# Patient Record
Sex: MALE | Race: WHITE | Employment: OTHER | URBAN - METROPOLITAN AREA
[De-identification: names, ages, dates, MRNs, and addresses within clinical notes are randomized per-mention and may not be internally consistent; named-entity substitution may affect disease eponyms.]

---

## 2017-06-03 PROBLEM — I21.4 NSTEMI (NON-ST ELEVATED MYOCARDIAL INFARCTION) (HCC): Status: ACTIVE | Noted: 2017-06-03

## 2017-06-03 PROBLEM — R07.9 CHEST PAIN: Status: ACTIVE | Noted: 2017-06-03

## 2017-06-05 PROBLEM — Z72.0 TOBACCO ABUSE: Status: ACTIVE | Noted: 2017-06-05

## 2017-06-05 PROBLEM — R73.03 PRE-DIABETES: Status: ACTIVE | Noted: 2017-06-05

## 2017-06-05 PROBLEM — E78.5 HYPERLIPIDEMIA: Status: ACTIVE | Noted: 2017-06-05

## 2017-06-12 PROBLEM — I82.90 THROMBUS: Status: ACTIVE | Noted: 2017-06-01

## 2017-06-16 ENCOUNTER — OFFICE VISIT (OUTPATIENT)
Dept: FAMILY MEDICINE CLINIC | Age: 52
End: 2017-06-16
Payer: COMMERCIAL

## 2017-06-16 ENCOUNTER — HOSPITAL ENCOUNTER (OUTPATIENT)
Age: 52
Setting detail: SPECIMEN
Discharge: HOME | End: 2017-06-16
Payer: COMMERCIAL

## 2017-06-16 DIAGNOSIS — Z51.81 MEDICATION MONITORING ENCOUNTER: ICD-10-CM

## 2017-06-16 DIAGNOSIS — I10 ESSENTIAL HYPERTENSION: ICD-10-CM

## 2017-06-16 DIAGNOSIS — F17.200 SMOKING: Primary | ICD-10-CM

## 2017-06-16 DIAGNOSIS — E78.2 MIXED HYPERLIPIDEMIA: ICD-10-CM

## 2017-06-16 DIAGNOSIS — I21.4 NSTEMI (NON-ST ELEVATED MYOCARDIAL INFARCTION) (HCC): ICD-10-CM

## 2017-06-16 PROCEDURE — 99204 OFFICE O/P NEW MOD 45 MIN: CPT | Performed by: FAMILY MEDICINE

## 2017-06-16 RX ORDER — ALPRAZOLAM 0.5 MG/1
0.5 TABLET ORAL NIGHTLY PRN
Qty: 90 TABLET | Refills: 1 | Status: SHIPPED | OUTPATIENT
Start: 2017-06-16 | End: 2018-01-11 | Stop reason: SDUPTHER

## 2017-06-16 RX ORDER — HYDROCODONE BITARTRATE AND ACETAMINOPHEN 5; 325 MG/1; MG/1
1 TABLET ORAL EVERY 8 HOURS
Qty: 90 TABLET | Refills: 0 | Status: SHIPPED | OUTPATIENT
Start: 2017-06-16 | End: 2017-07-18 | Stop reason: SDUPTHER

## 2017-06-16 RX ORDER — NICOTINE 21 MG/24HR
1 PATCH, TRANSDERMAL 24 HOURS TRANSDERMAL EVERY 24 HOURS
Qty: 30 PATCH | Refills: 3 | Status: SHIPPED | OUTPATIENT
Start: 2017-06-16 | End: 2019-04-16

## 2017-06-16 ASSESSMENT — PATIENT HEALTH QUESTIONNAIRE - PHQ9
1. LITTLE INTEREST OR PLEASURE IN DOING THINGS: 0
2. FEELING DOWN, DEPRESSED OR HOPELESS: 0
SUM OF ALL RESPONSES TO PHQ QUESTIONS 1-9: 0
SUM OF ALL RESPONSES TO PHQ9 QUESTIONS 1 & 2: 0

## 2017-06-16 ASSESSMENT — ENCOUNTER SYMPTOMS
ABDOMINAL PAIN: 0
BACK PAIN: 0
SORE THROAT: 0
STRIDOR: 0
CONSTIPATION: 0
EYE ITCHING: 0
RHINORRHEA: 0
CHEST TIGHTNESS: 0
EYE PAIN: 0
EYE DISCHARGE: 0
DIARRHEA: 0
APNEA: 0
BLOOD IN STOOL: 0
ABDOMINAL DISTENTION: 0
VOMITING: 0
SHORTNESS OF BREATH: 0
COUGH: 0
EYE REDNESS: 0
WHEEZING: 0
PHOTOPHOBIA: 0
SINUS PRESSURE: 0
CHOKING: 0
NAUSEA: 0
COLOR CHANGE: 0

## 2017-06-19 LAB
6-ACETYLMORPHINE, UR: NOT DETECTED
7-AMINOCLONAZEPAM, URINE: NOT DETECTED
ALPHA-OH-ALPRAZ, URINE: NOT DETECTED
ALPRAZOLAM, URINE: NOT DETECTED
AMPHETAMINES, URINE: NOT DETECTED
BARBITURATES, URINE: NOT DETECTED
BENZOYLECGONINE, UR: NOT DETECTED
BUPRENORPHINE URINE: NOT DETECTED
CARISOPRODOL, UR: NOT DETECTED
CLONAZEPAM, URINE: NOT DETECTED
CODEINE, URINE: NOT DETECTED
CREATININE URINE: 202.5 MG/DL (ref 20–400)
DIAZEPAM, URINE: NOT DETECTED
DRUGS EXPECTED, UR: NORMAL
EER HI RES INTERP UR: NORMAL
ETHYL GLUCURONIDE UR: NOT DETECTED
FENTANYL URINE: NOT DETECTED
HYDROCODONE, URINE: PRESENT
HYDROMORPHONE, URINE: NOT DETECTED
LORAZEPAM, URINE: NOT DETECTED
MARIJUANA METAB, UR: NOT DETECTED
MDA, UR: NOT DETECTED
MDEA, EVE, UR: NOT DETECTED
MDMA URINE: NOT DETECTED
MEPERIDINE METAB, UR: NOT DETECTED
METHADONE, URINE: NOT DETECTED
METHAMPHETAMINE, URINE: NOT DETECTED
METHYLPHENIDATE: NOT DETECTED
MIDAZOLAM, URINE: NOT DETECTED
MORPHINE URINE: NOT DETECTED
NORBUPRENORPHINE, URINE: NOT DETECTED
NORDIAZEPAM, URINE: NOT DETECTED
NORFENTANYL, URINE: NOT DETECTED
NORHYDROCODONE, URINE: PRESENT
NOROXYCODONE, URINE: NOT DETECTED
NOROXYMORPHONE, URINE: NOT DETECTED
OXAZEPAM, URINE: NOT DETECTED
OXYCODONE URINE: NOT DETECTED
OXYMORPHONE, URINE: NOT DETECTED
PAIN MANAGEMENT DRUG PANEL INTERP, URINE: NORMAL
PAIN MGT DRUG PANEL, HI RES, UR: NORMAL
PCP,URINE: NOT DETECTED
PHENTERMINE, UR: NOT DETECTED
PROPOXYPHENE, URINE: NOT DETECTED
TAPENTADOL, URINE: NOT DETECTED
TAPENTADOL-O-SULFATE, URINE: NOT DETECTED
TEMAZEPAM, URINE: NOT DETECTED
TRAMADOL, URINE: NOT DETECTED
ZOLPIDEM, URINE: NOT DETECTED

## 2017-07-18 RX ORDER — HYDROCODONE BITARTRATE AND ACETAMINOPHEN 5; 325 MG/1; MG/1
1 TABLET ORAL EVERY 8 HOURS
Qty: 90 TABLET | Refills: 0 | Status: SHIPPED | OUTPATIENT
Start: 2017-07-18 | End: 2017-08-22 | Stop reason: SDUPTHER

## 2017-08-22 RX ORDER — HYDROCODONE BITARTRATE AND ACETAMINOPHEN 5; 325 MG/1; MG/1
1 TABLET ORAL EVERY 8 HOURS
Qty: 90 TABLET | Refills: 0 | Status: SHIPPED | OUTPATIENT
Start: 2017-08-22 | End: 2017-09-22 | Stop reason: SDUPTHER

## 2017-09-22 PROBLEM — Z82.49 FAMILY HISTORY OF ISCHEMIC HEART DISEASE: Status: ACTIVE | Noted: 2017-09-22

## 2017-09-22 PROBLEM — I25.10 CORONARY ARTERY DISEASE INVOLVING NATIVE CORONARY ARTERY OF NATIVE HEART WITHOUT ANGINA PECTORIS: Status: ACTIVE | Noted: 2017-09-22

## 2017-09-22 PROBLEM — Z79.01 ANTICOAGULATED: Status: ACTIVE | Noted: 2017-09-22

## 2017-09-22 RX ORDER — HYDROCODONE BITARTRATE AND ACETAMINOPHEN 5; 325 MG/1; MG/1
1 TABLET ORAL EVERY 8 HOURS
Qty: 90 TABLET | Refills: 0 | Status: SHIPPED | OUTPATIENT
Start: 2017-09-22 | End: 2017-10-23 | Stop reason: SDUPTHER

## 2017-10-23 RX ORDER — HYDROCODONE BITARTRATE AND ACETAMINOPHEN 5; 325 MG/1; MG/1
1 TABLET ORAL EVERY 8 HOURS
Qty: 90 TABLET | Refills: 0 | Status: SHIPPED | OUTPATIENT
Start: 2017-10-23 | End: 2017-11-24 | Stop reason: SDUPTHER

## 2017-10-23 NOTE — TELEPHONE ENCOUNTER
From: Thee Holman  Sent: 10/21/2017 7:03 PM EDT  Subject: Medication Renewal Request    Thee Holman would like a refill of the following medications:  HYDROcodone-acetaminophen ([facility]) 5-325 MG per tablet Antonella Sauer MD]    Preferred pharmacy: [id number] [place] [phone] - F [phone]    Comment:

## 2017-11-17 RX ORDER — CITALOPRAM 20 MG/1
20 TABLET ORAL DAILY
Qty: 90 TABLET | Refills: 3 | Status: SHIPPED | OUTPATIENT
Start: 2017-11-17 | End: 2018-11-08 | Stop reason: SDUPTHER

## 2017-11-24 RX ORDER — HYDROCODONE BITARTRATE AND ACETAMINOPHEN 5; 325 MG/1; MG/1
1 TABLET ORAL EVERY 8 HOURS
Qty: 90 TABLET | Refills: 0 | Status: SHIPPED | OUTPATIENT
Start: 2017-11-24 | End: 2018-01-03 | Stop reason: SDUPTHER

## 2017-11-24 NOTE — TELEPHONE ENCOUNTER
From: Tosin Hagen  Sent: 11/24/2017 11:02 AM EST  Subject: Medication Renewal Request    Tosin Hagen would like a refill of the following medications:  HYDROcodone-acetaminophen ([facility]) 5-325 MG per tablet Danita Degroot MD]    Preferred pharmacy: [id number] [phone] - F [phone]    Comment:

## 2018-01-03 DIAGNOSIS — G89.29 CHRONIC BILATERAL LOW BACK PAIN WITHOUT SCIATICA: Primary | ICD-10-CM

## 2018-01-03 DIAGNOSIS — M54.50 CHRONIC BILATERAL LOW BACK PAIN WITHOUT SCIATICA: Primary | ICD-10-CM

## 2018-01-03 RX ORDER — HYDROCODONE BITARTRATE AND ACETAMINOPHEN 5; 325 MG/1; MG/1
1 TABLET ORAL EVERY 8 HOURS
Qty: 90 TABLET | Refills: 0 | Status: SHIPPED | OUTPATIENT
Start: 2018-01-03 | End: 2018-02-02 | Stop reason: SDUPTHER

## 2018-01-11 RX ORDER — ALPRAZOLAM 0.5 MG/1
TABLET ORAL
Qty: 90 TABLET | Refills: 1 | Status: SHIPPED | OUTPATIENT
Start: 2018-01-11 | End: 2018-07-12 | Stop reason: SDUPTHER

## 2018-01-11 NOTE — TELEPHONE ENCOUNTER
OARRS 1-3-18   Next Visit Date:  Future Appointments  Date Time Provider Kirby Acosta   1/31/2018 3:15 PM [address] [facility] [place]       Health Maintenance   Topic Date Due    Hepatitis C screen  1965    HIV screen  08/28/1980    DTaP/Tdap/Td vaccine (1 - Tdap) 08/28/1984    Pneumococcal med risk (1 of 1 - PPSV23) 08/28/1984    Colon cancer screen colonoscopy  08/28/2015    Flu vaccine (1) 09/01/2017    A1C test (Diabetic or Prediabetic)  06/04/2018    Potassium monitoring  09/22/2018    Creatinine monitoring  09/22/2018    Lipid screen  06/03/2022       Hemoglobin A1C (%)   Date Value   06/04/2017 5.9 (H)             ( goal A1C is < 7)   No results found for: LABMICR  LDL Cholesterol (mg/dL)   Date Value   06/03/2017 91       (goal LDL is <100)   AST (U/L)   Date Value   09/22/2017 11 (L)     ALT (U/L)   Date Value   09/22/2017 26     BUN (mg/dL)   Date Value   09/22/2017 16     BP Readings from Last 3 Encounters:   12/27/17 (!) 144/86   11/27/17 138/88   10/30/17 138/80          (goal 120/80)    All Future Testing planned in [facility]  Lab Frequency Next Occurrence   Lipid Panel Once 12/21/2017               Patient Active Problem List:     NSTEMI (non-ST elevated myocardial infarction) ([facility] [address]     Chest pain     Hypertension     Hyperlipidemia     Pre-diabetes     Tobacco abuse     Thrombus     Coronary artery disease involving native coronary artery of native heart without angina pectoris     Family history of ischemic heart disease     Anticoagulated     Chronic bilateral low back pain without sciatica

## 2018-02-02 DIAGNOSIS — M54.50 CHRONIC BILATERAL LOW BACK PAIN WITHOUT SCIATICA: ICD-10-CM

## 2018-02-02 DIAGNOSIS — G89.29 CHRONIC BILATERAL LOW BACK PAIN WITHOUT SCIATICA: ICD-10-CM

## 2018-02-02 RX ORDER — HYDROCODONE BITARTRATE AND ACETAMINOPHEN 5; 325 MG/1; MG/1
1 TABLET ORAL EVERY 8 HOURS
Qty: 90 TABLET | Refills: 0 | Status: SHIPPED | OUTPATIENT
Start: 2018-02-02 | End: 2018-03-06 | Stop reason: SDUPTHER

## 2018-03-06 DIAGNOSIS — G89.29 CHRONIC BILATERAL LOW BACK PAIN WITHOUT SCIATICA: ICD-10-CM

## 2018-03-06 DIAGNOSIS — M54.50 CHRONIC BILATERAL LOW BACK PAIN WITHOUT SCIATICA: ICD-10-CM

## 2018-03-06 RX ORDER — HYDROCODONE BITARTRATE AND ACETAMINOPHEN 5; 325 MG/1; MG/1
1 TABLET ORAL EVERY 8 HOURS
Qty: 90 TABLET | Refills: 0 | Status: SHIPPED | OUTPATIENT
Start: 2018-03-06 | End: 2018-04-09 | Stop reason: SDUPTHER

## 2018-03-06 NOTE — TELEPHONE ENCOUNTER
Next Visit Date:  Future Appointments  Date Time Provider Kirby Acosta   3/12/2018 2:45 PM [address] [facility]       Health Maintenance   Topic Date Due    Hepatitis C screen  1965    HIV screen  08/28/1980    DTaP/Tdap/Td vaccine (1 - Tdap) 08/28/1984    Pneumococcal med risk (1 of 1 - PPSV23) 08/28/1984    Shingles Vaccine (1 of 2 - 2 Dose Series) 08/28/2015    Colon cancer screen colonoscopy  08/28/2015    Flu vaccine (1) 09/01/2017    A1C test (Diabetic or Prediabetic)  06/04/2018    Potassium monitoring  09/22/2018    Creatinine monitoring  09/22/2018    Lipid screen  06/03/2022       Hemoglobin A1C (%)   Date Value   06/04/2017 5.9 (H)             ( goal A1C is < 7)   No results found for: LABMICR  LDL Cholesterol (mg/dL)   Date Value   06/03/2017 91       (goal LDL is <100)   AST (U/L)   Date Value   09/22/2017 11 (L)     ALT (U/L)   Date Value   09/22/2017 26     BUN (mg/dL)   Date Value   09/22/2017 16     BP Readings from Last 3 Encounters:   02/12/18 132/84   12/27/17 (!) 144/86   11/27/17 138/88          (goal 120/80)    All Future Testing planned in [facility]              Patient Active Problem List:     NSTEMI (non-ST elevated myocardial infarction) ([facility] [address]     Chest pain     Hypertension     Hyperlipidemia     Pre-diabetes     Tobacco abuse     Thrombus     Coronary artery disease involving native coronary artery of native heart without angina pectoris     Family history of ischemic heart disease     Anticoagulated     Chronic bilateral low back pain without sciatica

## 2018-04-09 ENCOUNTER — PATIENT MESSAGE (OUTPATIENT)
Dept: FAMILY MEDICINE CLINIC | Age: 53
End: 2018-04-09

## 2018-04-09 DIAGNOSIS — M54.50 CHRONIC BILATERAL LOW BACK PAIN WITHOUT SCIATICA: ICD-10-CM

## 2018-04-09 DIAGNOSIS — G89.29 CHRONIC BILATERAL LOW BACK PAIN WITHOUT SCIATICA: ICD-10-CM

## 2018-04-09 RX ORDER — HYDROCODONE BITARTRATE AND ACETAMINOPHEN 5; 325 MG/1; MG/1
1 TABLET ORAL EVERY 8 HOURS
Qty: 90 TABLET | Refills: 0 | Status: SHIPPED | OUTPATIENT
Start: 2018-04-09 | End: 2018-05-07 | Stop reason: SDUPTHER

## 2018-05-07 DIAGNOSIS — M54.50 CHRONIC BILATERAL LOW BACK PAIN WITHOUT SCIATICA: ICD-10-CM

## 2018-05-07 DIAGNOSIS — G89.29 CHRONIC BILATERAL LOW BACK PAIN WITHOUT SCIATICA: ICD-10-CM

## 2018-05-07 RX ORDER — HYDROCODONE BITARTRATE AND ACETAMINOPHEN 5; 325 MG/1; MG/1
1 TABLET ORAL EVERY 8 HOURS
Qty: 90 TABLET | Refills: 0 | Status: SHIPPED | OUTPATIENT
Start: 2018-05-07 | End: 2018-06-07 | Stop reason: SDUPTHER

## 2018-06-07 DIAGNOSIS — G89.29 CHRONIC BILATERAL LOW BACK PAIN WITHOUT SCIATICA: ICD-10-CM

## 2018-06-07 DIAGNOSIS — M54.50 CHRONIC BILATERAL LOW BACK PAIN WITHOUT SCIATICA: ICD-10-CM

## 2018-06-07 RX ORDER — HYDROCODONE BITARTRATE AND ACETAMINOPHEN 5; 325 MG/1; MG/1
1 TABLET ORAL EVERY 8 HOURS
Qty: 90 TABLET | Refills: 0 | Status: SHIPPED | OUTPATIENT
Start: 2018-06-07 | End: 2018-07-17 | Stop reason: SDUPTHER

## 2018-06-18 ENCOUNTER — TELEPHONE (OUTPATIENT)
Dept: FAMILY MEDICINE CLINIC | Age: 53
End: 2018-06-18

## 2018-07-12 DIAGNOSIS — F41.1 GAD (GENERALIZED ANXIETY DISORDER): Primary | ICD-10-CM

## 2018-07-13 PROBLEM — F41.1 GAD (GENERALIZED ANXIETY DISORDER): Status: ACTIVE | Noted: 2018-07-13

## 2018-07-13 RX ORDER — ALPRAZOLAM 0.5 MG/1
TABLET ORAL
Qty: 90 TABLET | Refills: 1 | Status: SHIPPED | OUTPATIENT
Start: 2018-07-13 | End: 2018-08-08 | Stop reason: SDUPTHER

## 2018-07-16 ENCOUNTER — PATIENT MESSAGE (OUTPATIENT)
Dept: FAMILY MEDICINE CLINIC | Age: 53
End: 2018-07-16

## 2018-07-17 DIAGNOSIS — G89.29 CHRONIC BILATERAL LOW BACK PAIN WITHOUT SCIATICA: ICD-10-CM

## 2018-07-17 DIAGNOSIS — M54.50 CHRONIC BILATERAL LOW BACK PAIN WITHOUT SCIATICA: ICD-10-CM

## 2018-07-17 RX ORDER — HYDROCODONE BITARTRATE AND ACETAMINOPHEN 5; 325 MG/1; MG/1
1 TABLET ORAL EVERY 8 HOURS
Qty: 90 TABLET | Refills: 0 | Status: SHIPPED | OUTPATIENT
Start: 2018-07-17 | End: 2018-08-08 | Stop reason: SDUPTHER

## 2018-07-17 NOTE — TELEPHONE ENCOUNTER
lv 6/16/17  nv 8/8/18  Oars 7/13/18    Next Visit Date:  Future Appointments  Date Time Provider Kirby Perryi   8/8/2018 1:40 PM MD [facility]   8/10/2018 2:45 PM [address] [place] [address] Maintenance   Topic Date Due    Hepatitis C screen  1965    HIV screen  08/28/1980    DTaP/Tdap/Td vaccine (1 - Tdap) 08/28/1984    Pneumococcal med risk (1 of 1 - PPSV23) 08/28/1984    Shingles Vaccine (1 of 2 - 2 Dose Series) 08/28/2015    Colon cancer screen colonoscopy  08/28/2015    A1C test (Diabetic or Prediabetic)  06/04/2018    Flu vaccine (1) 09/01/2018    Potassium monitoring  09/22/2018    Creatinine monitoring  09/22/2018    Lipid screen  06/03/2022       Hemoglobin A1C (%)   Date Value   06/04/2017 5.9 (H)             ( goal A1C is < 7)   No results found for: LABMICR  LDL Cholesterol (mg/dL)   Date Value   06/03/2017 91       (goal LDL is <100)   AST (U/L)   Date Value   09/22/2017 11 (L)     ALT (U/L)   Date Value   09/22/2017 26     BUN (mg/dL)   Date Value   09/22/2017 16     BP Readings from Last 3 Encounters:   07/13/18 (!) 148/82   06/29/18 128/70   05/26/18 (!) 160/98          (goal 120/80)    All Future Testing planned in [facility]              Patient Active Problem List:     NSTEMI (non-ST elevated myocardial infarction) ([facility] [address]     Chest pain     Hypertension     Hyperlipidemia     Pre-diabetes     Tobacco abuse     Thrombus     Coronary artery disease involving native coronary artery of native heart without angina pectoris     Family history of ischemic heart disease     Anticoagulated     Chronic bilateral low back pain without sciatica     FREDIS (generalized anxiety disorder)

## 2018-08-08 ENCOUNTER — OFFICE VISIT (OUTPATIENT)
Dept: FAMILY MEDICINE CLINIC | Age: 53
End: 2018-08-08
Payer: COMMERCIAL

## 2018-08-08 VITALS
WEIGHT: 257 LBS | BODY MASS INDEX: 37.95 KG/M2 | HEART RATE: 65 BPM | SYSTOLIC BLOOD PRESSURE: 124 MMHG | DIASTOLIC BLOOD PRESSURE: 80 MMHG | OXYGEN SATURATION: 97 %

## 2018-08-08 DIAGNOSIS — M54.50 CHRONIC BILATERAL LOW BACK PAIN WITHOUT SCIATICA: ICD-10-CM

## 2018-08-08 DIAGNOSIS — I10 ESSENTIAL HYPERTENSION: ICD-10-CM

## 2018-08-08 DIAGNOSIS — F41.1 GAD (GENERALIZED ANXIETY DISORDER): ICD-10-CM

## 2018-08-08 DIAGNOSIS — G89.29 CHRONIC BILATERAL LOW BACK PAIN WITHOUT SCIATICA: ICD-10-CM

## 2018-08-08 PROCEDURE — 3017F COLORECTAL CA SCREEN DOC REV: CPT | Performed by: FAMILY MEDICINE

## 2018-08-08 PROCEDURE — 4004F PT TOBACCO SCREEN RCVD TLK: CPT | Performed by: FAMILY MEDICINE

## 2018-08-08 PROCEDURE — G8417 CALC BMI ABV UP PARAM F/U: HCPCS | Performed by: FAMILY MEDICINE

## 2018-08-08 PROCEDURE — G8598 ASA/ANTIPLAT THER USED: HCPCS | Performed by: FAMILY MEDICINE

## 2018-08-08 PROCEDURE — G8427 DOCREV CUR MEDS BY ELIG CLIN: HCPCS | Performed by: FAMILY MEDICINE

## 2018-08-08 PROCEDURE — 99213 OFFICE O/P EST LOW 20 MIN: CPT | Performed by: FAMILY MEDICINE

## 2018-08-08 RX ORDER — HYDROCODONE BITARTRATE AND ACETAMINOPHEN 5; 325 MG/1; MG/1
1 TABLET ORAL EVERY 8 HOURS
Qty: 90 TABLET | Refills: 0 | Status: SHIPPED | OUTPATIENT
Start: 2018-09-08 | End: 2018-11-08 | Stop reason: SDUPTHER

## 2018-08-08 RX ORDER — LEVOCETIRIZINE DIHYDROCHLORIDE 5 MG/1
5 TABLET, FILM COATED ORAL NIGHTLY
Qty: 90 TABLET | Refills: 1 | COMMUNITY
Start: 2018-08-08 | End: 2019-04-16 | Stop reason: ALTCHOICE

## 2018-08-08 RX ORDER — HYDROCODONE BITARTRATE AND ACETAMINOPHEN 5; 325 MG/1; MG/1
1 TABLET ORAL EVERY 8 HOURS
Qty: 90 TABLET | Refills: 0 | Status: SHIPPED | OUTPATIENT
Start: 2018-08-08 | End: 2018-11-08 | Stop reason: SDUPTHER

## 2018-08-08 RX ORDER — ALPRAZOLAM 0.5 MG/1
TABLET ORAL
Qty: 270 TABLET | Refills: 1 | Status: SHIPPED | OUTPATIENT
Start: 2018-08-08 | End: 2018-11-08

## 2018-08-08 RX ORDER — SILDENAFIL CITRATE 20 MG/1
40-100 TABLET ORAL PRN
Qty: 30 TABLET | Refills: 11 | Status: SHIPPED | OUTPATIENT
Start: 2018-08-08 | End: 2019-03-07

## 2018-08-08 RX ORDER — TRIAMCINOLONE ACETONIDE 55 UG/1
2 SPRAY, METERED NASAL DAILY
Qty: 1 INHALER | Refills: 3 | COMMUNITY
Start: 2018-08-08 | End: 2020-07-28

## 2018-08-08 RX ORDER — HYDROCODONE BITARTRATE AND ACETAMINOPHEN 5; 325 MG/1; MG/1
1 TABLET ORAL EVERY 8 HOURS
Qty: 90 TABLET | Refills: 0 | Status: SHIPPED | OUTPATIENT
Start: 2018-10-08 | End: 2018-11-08 | Stop reason: SDUPTHER

## 2018-08-08 ASSESSMENT — ENCOUNTER SYMPTOMS
ABDOMINAL DISTENTION: 0
BACK PAIN: 0
VOMITING: 0
ABDOMINAL PAIN: 0
RHINORRHEA: 0
EYE ITCHING: 0
DIARRHEA: 0
WHEEZING: 0
SHORTNESS OF BREATH: 0
EYE PAIN: 0
COLOR CHANGE: 0
SINUS PRESSURE: 0
EYE DISCHARGE: 0
CHOKING: 0
PHOTOPHOBIA: 0
BLOOD IN STOOL: 0
COUGH: 0
CHEST TIGHTNESS: 0
SORE THROAT: 0
STRIDOR: 0
APNEA: 0
NAUSEA: 0
EYE REDNESS: 0
CONSTIPATION: 0

## 2018-08-08 ASSESSMENT — PATIENT HEALTH QUESTIONNAIRE - PHQ9
SUM OF ALL RESPONSES TO PHQ QUESTIONS 1-9: 0
2. FEELING DOWN, DEPRESSED OR HOPELESS: 0
SUM OF ALL RESPONSES TO PHQ QUESTIONS 1-9: 0
1. LITTLE INTEREST OR PLEASURE IN DOING THINGS: 0
SUM OF ALL RESPONSES TO PHQ9 QUESTIONS 1 & 2: 0

## 2018-08-08 NOTE — PROGRESS NOTES
Subjective:      Patient ID: Molly Thomas is a 46 y.o. male. Visit Information    Have you changed or started any medications since your last visit including any over-the-counter medicines, vitamins, or herbal medicines? no   Are you having any side effects from any of your medications? -  no  Have you stopped taking any of your medications? Is so, why? -  no    Have you seen any other physician or provider since your last visit? No  Have you had any other diagnostic tests since your last visit? No  Have you been seen in the emergency room and/or had an admission to a hospital since we last saw you? No  Have you had your routine dental cleaning in the past 6 months? yes -     Have you activated your [employer] account? If not, what are your barriers?  Yes     Patient Care Team:  Juan Dejesus MD as PCP - General    Medical History Review  Past Medical, Family, and Social History reviewed and  contribute to the patient presenting condition    Health Maintenance   Topic Date Due    Hepatitis C screen  1965    HIV screen  08/28/1980    DTaP/Tdap/Td vaccine (1 - Tdap) 08/28/1984    Pneumococcal med risk (1 of 1 - PPSV23) 08/28/1984    Shingles Vaccine (1 of 2 - 2 Dose Series) 08/28/2015    Colon cancer screen colonoscopy  08/28/2015    A1C test (Diabetic or Prediabetic)  06/04/2018    Flu vaccine (1) 09/01/2018    Potassium monitoring  09/22/2018    Creatinine monitoring  09/22/2018    Lipid screen  06/03/2022       HPI    Review of Systems    Objective:   Physical Exam    Assessment / Plan:
right side and 2+ on the left side. Patellar reflexes are 2+ on the right side and 2+ on the left side. Achilles reflexes are 2+ on the right side and 2+ on the left side. Skin: Skin is warm, dry and intact. He is not diaphoretic. No pallor. Psychiatric: He has a normal mood and affect. His speech is normal and behavior is normal. Judgment and thought content normal. Cognition and memory are normal.       Assessment & Plan:        1. Essential hypertension  - Discussed the role of hypertension in development of other disease courses such as CHF and atherosclerosis. - Encouraged patient to avoid sodium in the diet and reminded that the majority of sodium comes from packaged foods in cans and boxes which should be avoided where possible. - Encouraged good hydration and avoidance of caffeine where possible. - Discussed goals for blood pressure monitoring which should be 130/80. 2. FREDIS (generalized anxiety disorder)  Has been stable and well controlled over the past several months. - ALPRAZolam (XANAX) 0.5 MG tablet; take 1 tablet by mouth every evening if needed for sleep. Dispense: 270 tablet; Refill: 1    3. Chronic bilateral low back pain without sciatica  OARRS report reviewed and there are no concerns identified. He has been getting appropriate analgesic effect and has been having no side effects.   - HYDROcodone-acetaminophen (NORCO) 5-325 MG per tablet; Take 1 tablet by mouth every 8 hours for 30 days. [facility] Date: 10/8/18  Dispense: 90 tablet; Refill: 0  - HYDROcodone-acetaminophen (NORCO) 5-325 MG per tablet; Take 1 tablet by mouth every 8 hours for 30 days. [facility] Date: 9/8/18  Dispense: 90 tablet; Refill: 0  - HYDROcodone-acetaminophen (NORCO) 5-325 MG per tablet; Take 1 tablet by mouth every 8 hours for 30 days. [facility] Date: 8/8/18  Dispense: 90 tablet;  Refill: 0

## 2018-09-21 ENCOUNTER — TELEPHONE (OUTPATIENT)
Dept: FAMILY MEDICINE CLINIC | Age: 53
End: 2018-09-21

## 2018-11-08 ENCOUNTER — OFFICE VISIT (OUTPATIENT)
Dept: FAMILY MEDICINE CLINIC | Age: 53
End: 2018-11-08
Payer: COMMERCIAL

## 2018-11-08 ENCOUNTER — TELEPHONE (OUTPATIENT)
Dept: FAMILY MEDICINE CLINIC | Age: 53
End: 2018-11-08

## 2018-11-08 VITALS
DIASTOLIC BLOOD PRESSURE: 100 MMHG | OXYGEN SATURATION: 97 % | BODY MASS INDEX: 38.25 KG/M2 | HEART RATE: 91 BPM | WEIGHT: 259 LBS | SYSTOLIC BLOOD PRESSURE: 152 MMHG

## 2018-11-08 DIAGNOSIS — I10 ESSENTIAL HYPERTENSION: ICD-10-CM

## 2018-11-08 DIAGNOSIS — G89.29 CHRONIC BILATERAL LOW BACK PAIN WITHOUT SCIATICA: ICD-10-CM

## 2018-11-08 DIAGNOSIS — M54.50 CHRONIC BILATERAL LOW BACK PAIN WITHOUT SCIATICA: ICD-10-CM

## 2018-11-08 DIAGNOSIS — E66.01 CLASS 3 SEVERE OBESITY DUE TO EXCESS CALORIES WITH SERIOUS COMORBIDITY AND BODY MASS INDEX (BMI) OF 40.0 TO 44.9 IN ADULT (HCC): Primary | ICD-10-CM

## 2018-11-08 DIAGNOSIS — I25.83 CORONARY ARTERY DISEASE DUE TO LIPID RICH PLAQUE: Primary | ICD-10-CM

## 2018-11-08 DIAGNOSIS — I25.10 CORONARY ARTERY DISEASE DUE TO LIPID RICH PLAQUE: Primary | ICD-10-CM

## 2018-11-08 DIAGNOSIS — M12.9 ARTHROPATHY: ICD-10-CM

## 2018-11-08 DIAGNOSIS — E78.2 MIXED HYPERLIPIDEMIA: ICD-10-CM

## 2018-11-08 LAB
AMPHETAMINE SCREEN, URINE: NEGATIVE
BARBITURATE SCREEN, URINE: NEGATIVE
BENZODIAZEPINE SCREEN, URINE: POSITIVE
BUPRENORPHINE URINE: NEGATIVE
COCAINE METABOLITE SCREEN URINE: NEGATIVE
GABAPENTIN SCREEN, URINE: NEGATIVE
METHADONE SCREEN, URINE: NEGATIVE
METHAMPHETAMINE, URINE: NEGATIVE
OPIATE SCREEN URINE: POSITIVE
OXYCODONE SCREEN URINE: NORMAL
PHENCYCLIDINE SCREEN URINE: NEGATIVE
PROPOXYPHENE SCREEN, URINE: NEGATIVE
THC SCREEN, URINE: NEGATIVE
TRICYCLIC ANTIDEPRESSANTS, UR: NORMAL

## 2018-11-08 PROCEDURE — G8598 ASA/ANTIPLAT THER USED: HCPCS | Performed by: FAMILY MEDICINE

## 2018-11-08 PROCEDURE — 80305 DRUG TEST PRSMV DIR OPT OBS: CPT | Performed by: FAMILY MEDICINE

## 2018-11-08 PROCEDURE — 90471 IMMUNIZATION ADMIN: CPT | Performed by: FAMILY MEDICINE

## 2018-11-08 PROCEDURE — 4004F PT TOBACCO SCREEN RCVD TLK: CPT | Performed by: FAMILY MEDICINE

## 2018-11-08 PROCEDURE — 3017F COLORECTAL CA SCREEN DOC REV: CPT | Performed by: FAMILY MEDICINE

## 2018-11-08 PROCEDURE — G8484 FLU IMMUNIZE NO ADMIN: HCPCS | Performed by: FAMILY MEDICINE

## 2018-11-08 PROCEDURE — G8417 CALC BMI ABV UP PARAM F/U: HCPCS | Performed by: FAMILY MEDICINE

## 2018-11-08 PROCEDURE — 90732 PPSV23 VACC 2 YRS+ SUBQ/IM: CPT | Performed by: FAMILY MEDICINE

## 2018-11-08 PROCEDURE — G8427 DOCREV CUR MEDS BY ELIG CLIN: HCPCS | Performed by: FAMILY MEDICINE

## 2018-11-08 PROCEDURE — 99214 OFFICE O/P EST MOD 30 MIN: CPT | Performed by: FAMILY MEDICINE

## 2018-11-08 RX ORDER — LISINOPRIL 10 MG/1
10 TABLET ORAL DAILY
Qty: 90 TABLET | Refills: 3 | Status: SHIPPED | OUTPATIENT
Start: 2018-11-08 | End: 2018-11-21 | Stop reason: SDUPTHER

## 2018-11-08 RX ORDER — CITALOPRAM 20 MG/1
20 TABLET ORAL DAILY
Qty: 90 TABLET | Refills: 3 | Status: SHIPPED | OUTPATIENT
Start: 2018-11-08 | End: 2019-04-16 | Stop reason: SDUPTHER

## 2018-11-08 RX ORDER — HYDROCODONE BITARTRATE AND ACETAMINOPHEN 5; 325 MG/1; MG/1
1 TABLET ORAL EVERY 8 HOURS
Qty: 90 TABLET | Refills: 0 | Status: SHIPPED | OUTPATIENT
Start: 2018-12-20 | End: 2018-12-21 | Stop reason: SDUPTHER

## 2018-11-08 RX ORDER — HYDROCODONE BITARTRATE AND ACETAMINOPHEN 5; 325 MG/1; MG/1
1 TABLET ORAL EVERY 8 HOURS
Qty: 90 TABLET | Refills: 0 | Status: SHIPPED | OUTPATIENT
Start: 2019-01-19 | End: 2019-03-18 | Stop reason: SDUPTHER

## 2018-11-08 RX ORDER — HYDROCODONE BITARTRATE AND ACETAMINOPHEN 5; 325 MG/1; MG/1
1 TABLET ORAL EVERY 8 HOURS
Qty: 90 TABLET | Refills: 0 | Status: SHIPPED | OUTPATIENT
Start: 2018-11-21 | End: 2019-04-16 | Stop reason: SDUPTHER

## 2018-11-08 ASSESSMENT — ENCOUNTER SYMPTOMS
EYE PAIN: 0
STRIDOR: 0
CHEST TIGHTNESS: 0
WHEEZING: 0
BLOOD IN STOOL: 0
RHINORRHEA: 0
CONSTIPATION: 0
EYE REDNESS: 0
NAUSEA: 0
COLOR CHANGE: 0
CHOKING: 0
SHORTNESS OF BREATH: 0
PHOTOPHOBIA: 0
ABDOMINAL DISTENTION: 0
APNEA: 0
VOMITING: 0
DIARRHEA: 0
BACK PAIN: 0
EYE DISCHARGE: 0
SORE THROAT: 0
ABDOMINAL PAIN: 0
SINUS PRESSURE: 0
EYE ITCHING: 0
COUGH: 0

## 2018-11-08 NOTE — PROGRESS NOTES
membrane is not erythematous and not bulging. Nose: No mucosal edema or rhinorrhea. Mouth/Throat: Uvula is midline, oropharynx is clear and moist and mucous membranes are normal.   Eyes: Pupils are equal, round, and reactive to light. Conjunctivae and EOM are normal.   Neck: Trachea normal, normal range of motion and full passive range of motion without pain. Neck supple. No JVD present. Carotid bruit is not present. Cardiovascular: Normal rate, regular rhythm, S1 normal, S2 normal, normal heart sounds and normal pulses. Exam reveals no gallop, no S3, no S4, no distant heart sounds and no friction rub. No murmur heard. No systolic murmur is present   Pulmonary/Chest: Effort normal and breath sounds normal.   Abdominal: Normal appearance and bowel sounds are normal. There is no tenderness. Lymphadenopathy:     He has no cervical adenopathy. Right cervical: No superficial cervical and no deep cervical adenopathy present. Left cervical: No superficial cervical and no deep cervical adenopathy present. Neurological: He is alert. He has normal strength. No cranial nerve deficit or sensory deficit. He displays a negative Romberg sign. Reflex Scores:       Brachioradialis reflexes are 2+ on the right side and 2+ on the left side. Patellar reflexes are 2+ on the right side and 2+ on the left side. Achilles reflexes are 2+ on the right side and 2+ on the left side. Skin: Skin is warm, dry and intact. He is not diaphoretic. No pallor. Psychiatric: He has a normal mood and affect. His speech is normal and behavior is normal. Judgment and thought content normal. Cognition and memory are normal.     Vitals:    11/08/18 1325 11/08/18 1326   BP: (!) 160/100 (!) 152/100   Pulse: 91    SpO2: 97%    Weight: 259 lb (117.5 kg)        Assessment / Plan:   1. Chronic bilateral low back pain without sciatica  OARRS report reviewed, no concerns were identified.   He has been maintaining his functional status and has not been having any side effects.    - HYDROcodone-acetaminophen (NORCO) 5-325 MG per tablet; Take 1 tablet by mouth every 8 hours for 30 days. [facility] Date: 1/19/19  Dispense: 90 tablet; Refill: 0  - HYDROcodone-acetaminophen (NORCO) 5-325 MG per tablet; Take 1 tablet by mouth every 8 hours for 30 days. [facility] Date: 12/20/18  Dispense: 90 tablet; Refill: 0  - HYDROcodone-acetaminophen (NORCO) 5-325 MG per tablet; Take 1 tablet by mouth every 8 hours for 30 days. [facility] Date: 11/21/18  Dispense: 90 tablet; Refill: 0  - POCT Rapid Drug Screen    2. Coronary artery disease due to lipid rich plaque  Stable, no coronary symptoms. I would suggest that he discuss low dose Xarelto with his cardiologist.     3. Essential hypertension  - Discussed the role of hypertension in development of other disease courses such as CHF and atherosclerosis. - Encouraged patient to avoid sodium in the diet and reminded that the majority of sodium comes from packaged foods in cans and boxes which should be avoided where possible. - Encouraged good hydration and avoidance of caffeine where possible. - Discussed goals for blood pressure monitoring which should be 130/80.       4. Mixed hyperlipidemia  - Discussed the role of statins in the control of lipids and encouraged a low fat, low carbohydrate diet rich in vegetables and plant matter. - Discussed the side effects of lipid lowering medications which include but are not limited to myalgia especially in the larger muscle groups such as the thighs, back, and shoulders and suggested starting a 300mg CoQ10 supplement if these become problematic or contacting the office if they are intolerable or otherwise not controlled. - Discussed the goal for lipid lowering as an LDL less than 100.      5.  Arthropathy  - diclofenac (PENNSAID) 2 % SOLN; Place 2 g onto the skin 2 times daily  Dispense: 112 g; Refill: 11

## 2018-11-12 DIAGNOSIS — G89.29 CHRONIC BILATERAL LOW BACK PAIN WITHOUT SCIATICA: ICD-10-CM

## 2018-11-12 DIAGNOSIS — M54.50 CHRONIC BILATERAL LOW BACK PAIN WITHOUT SCIATICA: ICD-10-CM

## 2019-02-04 ENCOUNTER — TELEPHONE (OUTPATIENT)
Dept: FAMILY MEDICINE CLINIC | Age: 54
End: 2019-02-04

## 2019-02-15 DIAGNOSIS — F41.9 ANXIETY: Primary | ICD-10-CM

## 2019-02-15 RX ORDER — ALPRAZOLAM 0.5 MG/1
TABLET ORAL
Qty: 270 TABLET | Refills: 1 | Status: SHIPPED | OUTPATIENT
Start: 2019-02-15 | End: 2019-08-20 | Stop reason: SDUPTHER

## 2019-03-18 DIAGNOSIS — G89.29 CHRONIC BILATERAL LOW BACK PAIN WITHOUT SCIATICA: ICD-10-CM

## 2019-03-18 DIAGNOSIS — M54.50 CHRONIC BILATERAL LOW BACK PAIN WITHOUT SCIATICA: ICD-10-CM

## 2019-03-18 RX ORDER — HYDROCODONE BITARTRATE AND ACETAMINOPHEN 5; 325 MG/1; MG/1
1 TABLET ORAL EVERY 8 HOURS
Qty: 90 TABLET | Refills: 0 | Status: SHIPPED | OUTPATIENT
Start: 2019-03-18 | End: 2019-04-16 | Stop reason: SDUPTHER

## 2019-04-16 ENCOUNTER — OFFICE VISIT (OUTPATIENT)
Dept: FAMILY MEDICINE CLINIC | Age: 54
End: 2019-04-16
Payer: COMMERCIAL

## 2019-04-16 VITALS
BODY MASS INDEX: 40.78 KG/M2 | DIASTOLIC BLOOD PRESSURE: 86 MMHG | WEIGHT: 260.4 LBS | SYSTOLIC BLOOD PRESSURE: 136 MMHG | HEART RATE: 82 BPM | OXYGEN SATURATION: 97 %

## 2019-04-16 DIAGNOSIS — I10 ESSENTIAL HYPERTENSION: ICD-10-CM

## 2019-04-16 DIAGNOSIS — Z72.0 TOBACCO USE: ICD-10-CM

## 2019-04-16 DIAGNOSIS — I25.83 CORONARY ARTERY DISEASE DUE TO LIPID RICH PLAQUE: ICD-10-CM

## 2019-04-16 DIAGNOSIS — M54.50 CHRONIC BILATERAL LOW BACK PAIN WITHOUT SCIATICA: ICD-10-CM

## 2019-04-16 DIAGNOSIS — M70.22 OLECRANON BURSITIS OF LEFT ELBOW: Primary | ICD-10-CM

## 2019-04-16 DIAGNOSIS — I25.10 CORONARY ARTERY DISEASE DUE TO LIPID RICH PLAQUE: ICD-10-CM

## 2019-04-16 DIAGNOSIS — G89.29 CHRONIC BILATERAL LOW BACK PAIN WITHOUT SCIATICA: ICD-10-CM

## 2019-04-16 DIAGNOSIS — E78.2 MIXED HYPERLIPIDEMIA: ICD-10-CM

## 2019-04-16 PROCEDURE — G8427 DOCREV CUR MEDS BY ELIG CLIN: HCPCS | Performed by: FAMILY MEDICINE

## 2019-04-16 PROCEDURE — 3017F COLORECTAL CA SCREEN DOC REV: CPT | Performed by: FAMILY MEDICINE

## 2019-04-16 PROCEDURE — 99214 OFFICE O/P EST MOD 30 MIN: CPT | Performed by: FAMILY MEDICINE

## 2019-04-16 PROCEDURE — G8417 CALC BMI ABV UP PARAM F/U: HCPCS | Performed by: FAMILY MEDICINE

## 2019-04-16 PROCEDURE — 4004F PT TOBACCO SCREEN RCVD TLK: CPT | Performed by: FAMILY MEDICINE

## 2019-04-16 PROCEDURE — G8598 ASA/ANTIPLAT THER USED: HCPCS | Performed by: FAMILY MEDICINE

## 2019-04-16 RX ORDER — HYDROCODONE BITARTRATE AND ACETAMINOPHEN 5; 325 MG/1; MG/1
1 TABLET ORAL EVERY 6 HOURS PRN
Qty: 120 TABLET | Refills: 0 | Status: SHIPPED | OUTPATIENT
Start: 2019-04-16 | End: 2019-05-16

## 2019-04-16 RX ORDER — HYDROCODONE BITARTRATE AND ACETAMINOPHEN 5; 325 MG/1; MG/1
1 TABLET ORAL EVERY 8 HOURS
Qty: 120 TABLET | Refills: 0 | Status: SHIPPED | OUTPATIENT
Start: 2019-06-16 | End: 2019-08-01 | Stop reason: SDUPTHER

## 2019-04-16 RX ORDER — CITALOPRAM 20 MG/1
20 TABLET ORAL DAILY
Qty: 90 TABLET | Refills: 3 | Status: SHIPPED | OUTPATIENT
Start: 2019-04-16

## 2019-04-16 RX ORDER — PREDNISONE 50 MG/1
50 TABLET ORAL
Qty: 10 TABLET | Refills: 0 | Status: SHIPPED | OUTPATIENT
Start: 2019-04-16 | End: 2019-04-26

## 2019-04-16 RX ORDER — SILDENAFIL CITRATE 20 MG/1
40-100 TABLET ORAL DAILY PRN
Qty: 30 TABLET | Refills: 11 | Status: SHIPPED | OUTPATIENT
Start: 2019-04-16 | End: 2019-08-16 | Stop reason: CLARIF

## 2019-04-16 ASSESSMENT — ENCOUNTER SYMPTOMS
VOMITING: 0
APNEA: 0
CHOKING: 0
SORE THROAT: 0
EYE REDNESS: 0
COLOR CHANGE: 0
CHEST TIGHTNESS: 0
EYE PAIN: 0
BLOOD IN STOOL: 0
SINUS PRESSURE: 0
COUGH: 0
ABDOMINAL DISTENTION: 0
BACK PAIN: 1
EYE DISCHARGE: 0
CONSTIPATION: 0
PHOTOPHOBIA: 0
ABDOMINAL PAIN: 0
NAUSEA: 0
WHEEZING: 0
SHORTNESS OF BREATH: 0
STRIDOR: 0
EYE ITCHING: 0
RHINORRHEA: 0
DIARRHEA: 0

## 2019-04-16 ASSESSMENT — PATIENT HEALTH QUESTIONNAIRE - PHQ9
SUM OF ALL RESPONSES TO PHQ QUESTIONS 1-9: 0
SUM OF ALL RESPONSES TO PHQ QUESTIONS 1-9: 0
1. LITTLE INTEREST OR PLEASURE IN DOING THINGS: 0
SUM OF ALL RESPONSES TO PHQ9 QUESTIONS 1 & 2: 0
2. FEELING DOWN, DEPRESSED OR HOPELESS: 0

## 2019-04-16 NOTE — PROGRESS NOTES
Subjective:      Patient ID: Savannah Dee is a 48 y.o. male. Visit Information    Have you changed or started any medications since your last visit including any over-the-counter medicines, vitamins, or herbal medicines? no   Are you having any side effects from any of your medications? -  no  Have you stopped taking any of your medications? Is so, why? -  no    Have you seen any other physician or provider since your last visit? No  Have you had any other diagnostic tests since your last visit? No  Have you been seen in the emergency room and/or had an admission to a hospital since we last saw you? No  Have you had your routine dental cleaning in the past 6 months? yes -     Have you activated your [employer] account? If not, what are your barriers?  Yes     Patient Care Team:  Torito Cardenas MD as PCP - Nacho Vega MD as PCP - S Attributed Provider    Medical History Review  Past Medical, Family, and Social History reviewed and  contribute to the patient presenting condition    Health Maintenance   Topic Date Due    Hepatitis C screen  1965    HIV screen  08/28/1980    DTaP/Tdap/Td vaccine (1 - Tdap) 08/28/1984    Shingles Vaccine (1 of 2) 08/28/2015    Colon cancer screen colonoscopy  08/28/2015    A1C test (Diabetic or Prediabetic)  06/04/2018    Flu vaccine (Season Ended) 12/01/2019 (Originally 9/1/2019)    Potassium monitoring  09/17/2019    Creatinine monitoring  09/17/2019    Lipid screen  09/17/2023    Pneumococcal 0-64 years Vaccine  Completed       HPI    Review of Systems    Objective:   Physical Exam    Assessment / Plan:

## 2019-04-16 NOTE — PROGRESS NOTES
Subjective:      Patient ID: Tai Kim is a 48 y.o. male. HPI  Here for follow up of CAD, chronic pain, and has been having some trouble with sinus congestion and has been having some swelling on the left elbow. There is no stiffness in the left elbow and no pain but there is a large fluid filled lump. The nasal congestion is fairly typical for him in the spring and he has been able to manage it well enough with over the counter medications. Review of Systems   Constitutional: Negative for activity change, appetite change, chills, diaphoresis, fatigue, fever and unexpected weight change. HENT: Negative for congestion, dental problem, ear pain, hearing loss, mouth sores, nosebleeds, postnasal drip, rhinorrhea, sinus pressure and sore throat. Eyes: Negative for photophobia, pain, discharge, redness, itching and visual disturbance. Respiratory: Negative for apnea, cough, choking, chest tightness, shortness of breath, wheezing and stridor. Cardiovascular: Negative for chest pain, palpitations and leg swelling. Gastrointestinal: Negative for abdominal distention, abdominal pain, blood in stool, constipation, diarrhea, nausea and vomiting. Genitourinary: Negative for decreased urine volume, difficulty urinating, dysuria, flank pain, frequency, scrotal swelling, testicular pain and urgency. Musculoskeletal: Positive for arthralgias and back pain. Negative for gait problem, joint swelling, myalgias, neck pain and neck stiffness. Skin: Negative for color change, pallor and rash. Neurological: Negative for dizziness, tremors, seizures, syncope, facial asymmetry, speech difficulty, weakness, light-headedness, numbness and headaches. Psychiatric/Behavioral: Negative for agitation, behavioral problems, decreased concentration, sleep disturbance and suicidal ideas. The patient is not nervous/anxious.         Objective:   Physical Exam   Constitutional: He appears well-developed and well-nourished. HENT:   Head: Normocephalic. Right Ear: Tympanic membrane is not erythematous and not bulging. Left Ear: Tympanic membrane is not erythematous and not bulging. Nose: No mucosal edema or rhinorrhea. Mouth/Throat: Uvula is midline, oropharynx is clear and moist and mucous membranes are normal.   Eyes: Pupils are equal, round, and reactive to light. Conjunctivae and EOM are normal.   Neck: Trachea normal, normal range of motion and full passive range of motion without pain. Neck supple. No JVD present. Carotid bruit is not present. Cardiovascular: Normal rate, regular rhythm, S1 normal, S2 normal, normal heart sounds and normal pulses. Exam reveals no gallop, no S3, no S4, no distant heart sounds and no friction rub. No murmur heard. No systolic murmur is present. Pulmonary/Chest: Effort normal and breath sounds normal.   Abdominal: Normal appearance and bowel sounds are normal. There is no tenderness. Musculoskeletal:        Lumbar back: He exhibits decreased range of motion, tenderness, bony tenderness and pain. He exhibits no swelling, no edema, no deformity, no laceration, no spasm and normal pulse. Back:         Arms:  Lymphadenopathy:     He has no cervical adenopathy. Right cervical: No superficial cervical and no deep cervical adenopathy present. Left cervical: No superficial cervical and no deep cervical adenopathy present. Neurological: He is alert. He has normal strength. No cranial nerve deficit or sensory deficit. He displays a negative Romberg sign. Reflex Scores:       Brachioradialis reflexes are 2+ on the right side and 2+ on the left side. Patellar reflexes are 2+ on the right side and 2+ on the left side. Achilles reflexes are 2+ on the right side and 2+ on the left side. Skin: Skin is warm, dry and intact. He is not diaphoretic. No pallor. Psychiatric: He has a normal mood and affect.  His speech is normal and behavior is normal. Judgment and thought content normal. Cognition and memory are normal.     Vitals:    04/16/19 1656   BP: 136/86   Pulse: 82   SpO2: 97%   Weight: 260 lb 6.4 oz (118.1 kg)       Assessment / Plan:   1. Chronic bilateral low back pain without sciatica  OARRS report reviewed, no concerns were identified. Has been getting appropriate analgesic relief from the medication but there has been some difficulty with not lasting long enough so I'd like to increase from one pill every 8 hours to one every 6 hours. He denies any complications from side effects and demonstrates no concerns for abuse or diversion of medication.   - HYDROcodone-acetaminophen (NORCO) 5-325 MG per tablet; Take 1 tablet by mouth every 8 hours for 30 days. Dispense: 120 tablet; Refill: 0  - HYDROcodone-acetaminophen (NORCO) 5-325 MG per tablet; Take 1 tablet by mouth every 6 hours as needed for Pain for up to 30 days. Dispense: 120 tablet; Refill: 0  - HYDROcodone-acetaminophen (NORCO) 5-325 MG per tablet; Take 1 tablet by mouth every 6 hours as needed for Pain for up to 30 days. Dispense: 120 tablet; Refill: 0    2. Olecranon bursitis of left elbow  Small non-painful effusion noted, I would like to use a topical pennsaid application for a few days coupled with PO steroid burst, compression sleeve and avoidance of resting his elbow on anything that will worsen the problem (which was discussed with him in detail). 3. Coronary artery disease due to lipid rich plaque  Stable, no angina or cardiopulmonary complaints but I did discuss with him in detail the fact that his continued smoking will have a negative impact on his cardiovascular health an continues to put him at risk of adverse event. Because there is a sedentary lifestyle with long periods on the road, I also mentioned to him that it would be in his best interest to increase physical activity wherever possible.      4. Essential hypertension  - Discussed the role of hypertension in

## 2019-08-01 DIAGNOSIS — M54.50 CHRONIC BILATERAL LOW BACK PAIN WITHOUT SCIATICA: ICD-10-CM

## 2019-08-01 DIAGNOSIS — G89.29 CHRONIC BILATERAL LOW BACK PAIN WITHOUT SCIATICA: ICD-10-CM

## 2019-08-01 RX ORDER — HYDROCODONE BITARTRATE AND ACETAMINOPHEN 5; 325 MG/1; MG/1
1 TABLET ORAL EVERY 8 HOURS
Qty: 120 TABLET | Refills: 0 | Status: SHIPPED | OUTPATIENT
Start: 2019-08-01 | End: 2019-08-02 | Stop reason: SDUPTHER

## 2019-08-01 NOTE — TELEPHONE ENCOUNTER
Next Visit Date:  Future Appointments   Date Time Provider Kirby Acosta   8/8/2019  9:30 AM [address] [place] [address] [facility] Maintenance   Topic Date Due    Hepatitis C screen  1965    HIV screen  08/28/1980    DTaP/Tdap/Td vaccine (1 - Tdap) 08/28/1984    Shingles Vaccine (1 of 2) 08/28/2015    Colon cancer screen colonoscopy  08/28/2015    A1C test (Diabetic or Prediabetic)  06/04/2018    Flu vaccine (1) 12/01/2019 (Originally 9/1/2019)    Potassium monitoring  09/17/2019    Creatinine monitoring  09/17/2019    Lipid screen  09/17/2023    Pneumococcal 0-64 years Vaccine  Completed       Hemoglobin A1C (%)   Date Value   06/04/2017 5.9 (H)             ( goal A1C is < 7)   No results found for: LABMICR  LDL Cholesterol (mg/dL)   Date Value   09/17/2018 84   06/03/2017 91       (goal LDL is <100)   AST (U/L)   Date Value   09/17/2018 20     ALT (U/L)   Date Value   09/17/2018 30     BUN (mg/dL)   Date Value   09/17/2018 15     BP Readings from Last 3 Encounters:   07/11/19 (!) 154/90   06/17/19 (!) 146/100   06/13/19 (!) 152/96          (goal 120/80)    All Future Testing planned in [facility]  Lab Frequency Next Occurrence   Basic Metabolic Panel Once 84/01/9578   CBC Once 08/23/2019               Patient Active Problem List:     NSTEMI (non-ST elevated myocardial infarction) ([facility] [address]     Chest pain     Hypertension     Hyperlipidemia     Pre-diabetes     Tobacco abuse     Thrombus     Coronary artery disease involving native coronary artery of native heart without angina pectoris     Family history of ischemic heart disease     Anticoagulated     Chronic bilateral low back pain without sciatica     FREDIS (generalized anxiety disorder)

## 2019-08-02 ENCOUNTER — TELEPHONE (OUTPATIENT)
Dept: FAMILY MEDICINE CLINIC | Age: 54
End: 2019-08-02

## 2019-08-02 DIAGNOSIS — M54.50 CHRONIC BILATERAL LOW BACK PAIN WITHOUT SCIATICA: ICD-10-CM

## 2019-08-02 DIAGNOSIS — G89.29 CHRONIC BILATERAL LOW BACK PAIN WITHOUT SCIATICA: ICD-10-CM

## 2019-08-02 RX ORDER — HYDROCODONE BITARTRATE AND ACETAMINOPHEN 5; 325 MG/1; MG/1
1 TABLET ORAL EVERY 6 HOURS PRN
Qty: 120 TABLET | Refills: 0 | Status: SHIPPED | OUTPATIENT
Start: 2019-08-02 | End: 2019-09-03 | Stop reason: SDUPTHER

## 2019-08-20 DIAGNOSIS — F41.9 ANXIETY: ICD-10-CM

## 2019-08-20 RX ORDER — ALPRAZOLAM 0.5 MG/1
TABLET ORAL
Qty: 90 TABLET | Refills: 1 | Status: SHIPPED | OUTPATIENT
Start: 2019-08-20 | End: 2019-11-18

## 2019-09-03 DIAGNOSIS — M54.50 CHRONIC BILATERAL LOW BACK PAIN WITHOUT SCIATICA: ICD-10-CM

## 2019-09-03 DIAGNOSIS — G89.29 CHRONIC BILATERAL LOW BACK PAIN WITHOUT SCIATICA: ICD-10-CM

## 2019-09-03 RX ORDER — HYDROCODONE BITARTRATE AND ACETAMINOPHEN 5; 325 MG/1; MG/1
1 TABLET ORAL EVERY 6 HOURS PRN
Qty: 120 TABLET | Refills: 0 | Status: SHIPPED | OUTPATIENT
Start: 2019-09-03 | End: 2019-10-03